# Patient Record
Sex: MALE | Race: AMERICAN INDIAN OR ALASKA NATIVE | HISPANIC OR LATINO | Employment: UNEMPLOYED | URBAN - METROPOLITAN AREA
[De-identification: names, ages, dates, MRNs, and addresses within clinical notes are randomized per-mention and may not be internally consistent; named-entity substitution may affect disease eponyms.]

---

## 2024-05-21 ENCOUNTER — HOSPITAL ENCOUNTER (EMERGENCY)
Facility: HOSPITAL | Age: 34
Discharge: HOME | End: 2024-05-21
Admitting: EMERGENCY MEDICINE
Payer: MEDICAID

## 2024-05-21 ENCOUNTER — APPOINTMENT (OUTPATIENT)
Dept: RADIOLOGY | Facility: HOSPITAL | Age: 34
End: 2024-05-21
Attending: STUDENT IN AN ORGANIZED HEALTH CARE EDUCATION/TRAINING PROGRAM
Payer: MEDICAID

## 2024-05-21 VITALS
SYSTOLIC BLOOD PRESSURE: 124 MMHG | TEMPERATURE: 97.9 F | RESPIRATION RATE: 18 BRPM | OXYGEN SATURATION: 99 % | HEART RATE: 71 BPM | DIASTOLIC BLOOD PRESSURE: 78 MMHG | WEIGHT: 179 LBS
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
## 2024-05-21 DIAGNOSIS — R07.81 RIB PAIN: ICD-10-CM

## 2024-05-21 PROCEDURE — 250N000013 HC RX MED GY IP 250 OP 250 PS 637: Performed by: EMERGENCY MEDICINE

## 2024-05-21 PROCEDURE — 71101 X-RAY EXAM UNILAT RIBS/CHEST: CPT | Mod: LT

## 2024-05-21 PROCEDURE — 99284 EMERGENCY DEPT VISIT MOD MDM: CPT

## 2024-05-21 RX ORDER — ACETAMINOPHEN 325 MG/1
975 TABLET ORAL ONCE
Status: COMPLETED | OUTPATIENT
Start: 2024-05-21 | End: 2024-05-21

## 2024-05-21 RX ORDER — LIDOCAINE 50 MG/G
1 PATCH TOPICAL EVERY 24 HOURS
Qty: 10 PATCH | Refills: 0 | Status: SHIPPED | OUTPATIENT
Start: 2024-05-21 | End: 2024-05-31

## 2024-05-21 RX ORDER — LIDOCAINE 4 G/G
1 PATCH TOPICAL ONCE
Status: DISCONTINUED | OUTPATIENT
Start: 2024-05-21 | End: 2024-05-21 | Stop reason: HOSPADM

## 2024-05-21 RX ORDER — IBUPROFEN 600 MG/1
600 TABLET, FILM COATED ORAL ONCE
Status: COMPLETED | OUTPATIENT
Start: 2024-05-21 | End: 2024-05-21

## 2024-05-21 RX ORDER — IBUPROFEN 600 MG/1
600 TABLET, FILM COATED ORAL EVERY 6 HOURS PRN
Qty: 30 TABLET | Refills: 0 | Status: SHIPPED | OUTPATIENT
Start: 2024-05-21

## 2024-05-21 RX ORDER — CYCLOBENZAPRINE HCL 10 MG
10 TABLET ORAL 3 TIMES DAILY PRN
Qty: 20 TABLET | Refills: 0 | Status: SHIPPED | OUTPATIENT
Start: 2024-05-21 | End: 2024-05-28

## 2024-05-21 RX ORDER — ACETAMINOPHEN 500 MG
1000 TABLET ORAL EVERY 6 HOURS PRN
Qty: 60 TABLET | Refills: 0 | Status: SHIPPED | OUTPATIENT
Start: 2024-05-21

## 2024-05-21 RX ADMIN — LIDOCAINE 1 PATCH: 4 PATCH TOPICAL at 14:29

## 2024-05-21 RX ADMIN — IBUPROFEN 600 MG: 600 TABLET, FILM COATED ORAL at 14:29

## 2024-05-21 RX ADMIN — ACETAMINOPHEN 975 MG: 325 TABLET ORAL at 14:29

## 2024-05-21 ASSESSMENT — COLUMBIA-SUICIDE SEVERITY RATING SCALE - C-SSRS
2. HAVE YOU ACTUALLY HAD ANY THOUGHTS OF KILLING YOURSELF IN THE PAST MONTH?: NO
6. HAVE YOU EVER DONE ANYTHING, STARTED TO DO ANYTHING, OR PREPARED TO DO ANYTHING TO END YOUR LIFE?: NO
1. IN THE PAST MONTH, HAVE YOU WISHED YOU WERE DEAD OR WISHED YOU COULD GO TO SLEEP AND NOT WAKE UP?: NO

## 2024-05-21 ASSESSMENT — ACTIVITIES OF DAILY LIVING (ADL)
ADLS_ACUITY_SCORE: 35
ADLS_ACUITY_SCORE: 33
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
## 2024-10-09 ENCOUNTER — HOSPITAL ENCOUNTER (OUTPATIENT)
Dept: GENERAL RADIOLOGY | Facility: HOSPITAL | Age: 34
Discharge: HOME | End: 2024-10-09
Attending: PHYSICIAN ASSISTANT | Admitting: PHYSICIAN ASSISTANT
Payer: MEDICAID

## 2024-10-09 ENCOUNTER — OFFICE VISIT (OUTPATIENT)
Dept: FAMILY MEDICINE | Facility: CLINIC | Age: 34
End: 2024-10-09
Payer: MEDICAID

## 2024-10-09 VITALS
DIASTOLIC BLOOD PRESSURE: 81 MMHG | RESPIRATION RATE: 18 BRPM | SYSTOLIC BLOOD PRESSURE: 136 MMHG | HEART RATE: 83 BPM | TEMPERATURE: 98.2 F | OXYGEN SATURATION: 97 %

## 2024-10-09 DIAGNOSIS — M79.671 RIGHT FOOT PAIN: ICD-10-CM

## 2024-10-09 DIAGNOSIS — S92.354A CLOSED NONDISPLACED FRACTURE OF FIFTH METATARSAL BONE OF RIGHT FOOT, INITIAL ENCOUNTER: Primary | ICD-10-CM

## 2024-10-09 PROCEDURE — 99204 OFFICE O/P NEW MOD 45 MIN: CPT | Performed by: PHYSICIAN ASSISTANT

## 2024-10-09 PROCEDURE — 73630 X-RAY EXAM OF FOOT: CPT | Mod: RT

## 2024-10-09 NOTE — PROGRESS NOTES
Patient presents with:  Fall: Yesterday Fall and injured right leg.      Clinical Decision Making:  X-ray showed a proximal fifth metatarsal fracture/Villarreal fracture.  Patient is placed in a cam walker boot for protected weightbearing status.  Follow-up with orthopedics for definitive evaluation and treatment.        ICD-10-CM    1. Closed nondisplaced fracture of fifth metatarsal bone of right foot, initial encounter  S92.354A Ankle/Foot Bracing Supplies Order Walking Boot; Right; Non-pneumatic; Short     Orthopedic  Referral      2. Right foot pain  M79.671 XR Foot Right G/E 3 Views          Patient Instructions     Ice topically to the area 20 minutes on each hour while awake.  Take precautions to avoid damage to the skin.  After 2 days, transition to ice topically 20 minutes 3 times per day.  After 2 days may add heat and alternate ice and heat.  Ibuprofen 600 mg (3 tablets) 3 times a day with food for analgesia and anti-inflammatory effect.  Do not use the ibuprofen if you have contraindications to using NSAIDs.  Injuries to the extremities may be elevated above level of the heart continuously for the first 2 days as much as possible.  Use of cam walker boot.  May be out of the boot for sleep and for hygiene.    Return to see orthopedics for definitive evaluation and treatment.        HPI:  Carolyne Rowley is a 34 year old male who presents today evaluation of right foot pain.  Patient was walking down the stairs with his child and suffered an inversion injury from a standing position to the right foot and hit the lateral aspect of the foot. He missed the last step of the stairs. Has had pain with weightbearing and has been unable to be fully weightbearing on the right lower extremity.  At this time patient denies ankle knee or hip pain of the ipsilateral side or contralateral left lower extremity injury head neck back or upper extremity injury to report.  At home has been activity modification elevation  and rest.    History obtained from chart review, the patient, and phone  with Mame.    Problem List:  There are no relevant problems documented for this patient.      No past medical history on file.    Social History     Tobacco Use    Smoking status: Every Day     Types: Vaping Device    Smokeless tobacco: Not on file   Substance Use Topics    Alcohol use: Not on file       Review of Systems  As above in HPI otherwise negative.    Vitals:    10/09/24 1416   BP: 136/81   BP Location: Left arm   Patient Position: Sitting   Cuff Size: Adult Regular   Pulse: 83   Resp: 18   Temp: 98.2  F (36.8  C)   TempSrc: Oral   SpO2: 97%       General: Patient is resting comfortably no acute distress is afebrile  HEENT: Head is normocephalic atraumatic   eyes are PERRL EOMI sclera anicteric   Musculoskeletal:  Inspection of the right lower extremity shows that the knee and ankle are nontender palpation full range of motion without effusion.  Ankles with the negative squeeze test  No pain to palpation over the medial or lateral malleoli.  Tarsal bones are nontender to palpation.  Lisfranc ligament is nontender at the tarsometatarsal junction.  No noted ecchymoses or swelling of the dorsum of the right foot.  Palpation over the third fourth and fifth metatarsal rays.  Patient has maximal tenderness to palpation over the proximal portion of the fifth metatarsal.    Physical Exam      Labs:  Results for orders placed or performed in visit on 10/09/24   XR Foot Right G/E 3 Views     Status: None    Narrative    EXAM: XR FOOT RIGHT G/E 3 VIEWS  LOCATION: [facility]  DATE: 10/9/2024    INDICATION: Foot pain.  COMPARISON: None.      Impression    IMPRESSION: Acute nondisplaced, minimally distracted oblique avulsion fracture in the base of the fifth metatarsal extending into the TMT joint. No additional fracture. Normal joint alignment and spacing. Mild soft tissue swelling in the lateral   forefoot.        Radiology:  I have personally ordered and preliminarily reviewed the following xray, I have noted a proximal fifth metatarsal fracture.     At the end of the encounter, I discussed results, diagnosis, medications. Discussed red flags for immediate return to clinic/ER, as well as indications for follow up if no improvement. Patient understood and agreed to plan. Patient was stable for discharge.

## 2024-10-09 NOTE — PATIENT INSTRUCTIONS
Ice topically to the area 20 minutes on each hour while awake.  Take precautions to avoid damage to the skin.  After 2 days, transition to ice topically 20 minutes 3 times per day.  After 2 days may add heat and alternate ice and heat.  Ibuprofen 600 mg (3 tablets) 3 times a day with food for analgesia and anti-inflammatory effect.  Do not use the ibuprofen if you have contraindications to using NSAIDs.  Injuries to the extremities may be elevated above level of the heart continuously for the first 2 days as much as possible.  Use of cam walker boot.  May be out of the boot for sleep and for hygiene.    Return to see orthopedics for definitive evaluation and treatment.

## 2024-10-14 ENCOUNTER — OFFICE VISIT (OUTPATIENT)
Dept: PODIATRY | Facility: CLINIC | Age: 34
End: 2024-10-14
Attending: PHYSICIAN ASSISTANT

## 2024-10-14 VITALS — SYSTOLIC BLOOD PRESSURE: 131 MMHG | HEART RATE: 81 BPM | DIASTOLIC BLOOD PRESSURE: 82 MMHG | WEIGHT: 179 LBS

## 2024-10-14 DIAGNOSIS — S92.354A CLOSED NONDISPLACED FRACTURE OF FIFTH METATARSAL BONE OF RIGHT FOOT, INITIAL ENCOUNTER: ICD-10-CM

## 2024-10-14 PROCEDURE — 99203 OFFICE O/P NEW LOW 30 MIN: CPT | Performed by: PODIATRIST

## 2024-10-14 ASSESSMENT — PAIN SCALES - GENERAL: PAINLEVEL: EXTREME PAIN (8)

## 2024-10-14 NOTE — PROGRESS NOTES
PATIENT HISTORY:  Carolyne Rowley is a 34 year old male who presents to clinic with a chief complaint of a painful right foot.  The patient relates the pain is located over the outside on the right foot.  The patient relates injuring the foot on 10/09/2024 while at work. Beets box fell of a pallet onto his foot The patient was seen by Family Practice with x-rays revealing a fractured fifth metatarsal on the right foot.  The patient was offloaded with a CAM boot.  The patient was instructed to follow up in my clinic for further evaluation and treatment options.    Medical, surgical and family history was reviewed in the chart.    Vitals: /82   Pulse 81   Wt 81.2 kg (179 lb)   BMI= There is no height or weight on file to calculate BMI.    LOWER EXTREMITY PHYSICAL EXAM    Dermatologic: Skin is intact to right lower extremities without significant lesions, rash or abrasion.        Vascular: DP & PT pulses are intact & regular on the right.   CFT and skin temperature is normal to the right lower extremities.     Neurologic: Lower extremity sensation is intact to light touch.  No evidence of weakness in the right lower extremities.        Musculoskeletal: Patient is ambulatory without assistive device or brace.  No gross ankle deformity noted.  No foot or ankle joint effusion is noted.  Noted pain on palpation over the dorsolateral aspect of the right foot.  Mild surrounding edema noted.  No ecchymosis noted.    Diagnostics: Radiographs were evaluated including non-weightbearing AP, lateral and medial oblique views of the right foot reveals a closed nondisplaced fracture of the fifth metatarsal base with no cortical erosions or periosteal elevation.  All joint margins appear stable.  There is no apparent tumor formation noted.  There is no evidence of foreign body.  The images were reviewed with the patient explaining the findings.      ASSESSMENT / PLAN:     ICD-10-CM    1. Closed nondisplaced fracture of fifth  metatarsal bone of right foot, initial encounter  S92.354A Orthopedic  Referral          I have explained to Luis Ahi about the conditions.  We discussed the underlying contributing factors of the condition as well as the treatment plan and expected length of recovery.  At this time, the patient will remain in the CAM boot.  He will not be able to return to work.  He will return in one month for reevaluation and repeat x-rays.    Carolyne verbalized agreement with and understanding of the rational for the diagnosis and treatment plan.  All questions were answered to best of my ability and the patient's satisfaction. The patient was advised to contact the clinic with any questions that may arise after the clinic visit.      Disclaimer: This note consists of symbols derived from keyboarding, dictation and/or voice recognition software. As a result, there may be errors in the script that have gone undetected. Please consider this when interpreting information found in this chart.       OBED Stanley D.P.M., F.BETSEY.F.A.S.

## 2024-10-14 NOTE — LETTER
10/14/2024      Carolyne Rowley  [address]  [place] [address]      Dear Colleague,    Thank you for referring your patient, Carolyne Rowley, to the [facility] [place]. Please see a copy of my visit note below.    PATIENT HISTORY:  Carolyne Rowley is a 34 year old male who presents to clinic with a chief complaint of a painful right foot.  The patient relates the pain is located over the outside on the right foot.  The patient relates injuring the foot on 10/09/2024 while at work. Beets box fell of a pallet onto his foot The patient was seen by Family Practice with x-rays revealing a fractured fifth metatarsal on the right foot.  The patient was offloaded with a CAM boot.  The patient was instructed to follow up in my clinic for further evaluation and treatment options.    Medical, surgical and family history was reviewed in the chart.    Vitals: /82   Pulse 81   Wt 81.2 kg (179 lb)   BMI= There is no height or weight on file to calculate BMI.    LOWER EXTREMITY PHYSICAL EXAM    Dermatologic: Skin is intact to right lower extremities without significant lesions, rash or abrasion.        Vascular: DP & PT pulses are intact & regular on the right.   CFT and skin temperature is normal to the right lower extremities.     Neurologic: Lower extremity sensation is intact to light touch.  No evidence of weakness in the right lower extremities.        Musculoskeletal: Patient is ambulatory without assistive device or brace.  No gross ankle deformity noted.  No foot or ankle joint effusion is noted.  Noted pain on palpation over the dorsolateral aspect of the right foot.  Mild surrounding edema noted.  No ecchymosis noted.    Diagnostics: Radiographs were evaluated including non-weightbearing AP, lateral and medial oblique views of the right foot reveals a closed nondisplaced fracture of the fifth metatarsal base with no cortical erosions or periosteal elevation.  All joint margins appear  stable.  There is no apparent tumor formation noted.  There is no evidence of foreign body.  The images were reviewed with the patient explaining the findings.      ASSESSMENT / PLAN:     ICD-10-CM    1. Closed nondisplaced fracture of fifth metatarsal bone of right foot, initial encounter  S92.354A Orthopedic  Referral          I have explained to Carolyne about the conditions.  We discussed the underlying contributing factors of the condition as well as the treatment plan and expected length of recovery.  At this time, the patient will remain in the CAM boot.  He will not be able to return to work.  He will return in one month for reevaluation and repeat x-rays.    Carolyne verbalized agreement with and understanding of the rational for the diagnosis and treatment plan.  All questions were answered to best of my ability and the patient's satisfaction. The patient was advised to contact the clinic with any questions that may arise after the clinic visit.      Disclaimer: This note consists of symbols derived from keyboarding, dictation and/or voice recognition software. As a result, there may be errors in the script that have gone undetected. Please consider this when interpreting information found in this chart.       ALIE Prescott.PJOSIAH., F.A.C.F.A.S.      Again, thank you for allowing me to participate in the care of your patient.        Sincerely,        Arun Stanley DPM

## 2024-10-14 NOTE — LETTER
October 14, 2024      Carolyne Rowley  [address]   [place] [address]        To Whom It May Concern:    Carolyne Rowley  was seen on 10/14/24.  Please excuse him from work until 11/11/24 due to injury.        Sincerely,        Arun Stanley, KARLIEM

## 2024-10-14 NOTE — PATIENT INSTRUCTIONS
TOE & METATARSAL FRACTURES  The structure of the foot is complex, consisting of bones, muscles, tendons, and other soft tissues. Of the 26 bones in the foot, 19 are toe bones (phalanges) and metatarsal bones (the long bones in the midfoot). Fractures of the toe and metatarsal bones are common and require evaluation by a specialist. A foot and ankle surgeon should be seen for proper diagnosis and treatment, even if initial treatment has been received in an emergency room.  A fracture is a break in the bone. Fractures can be divided into two categories: traumatic fractures and stress fractures.  TRAUMATIC FRACTURES (also called acute fractures) are caused by a direct blow or impact, such as seriously stubbing your toe. Traumatic fractures can be displaced or non-displaced. If the fracture is displaced, the bone is broken in such a way that it has changed in position (dislocated).  Signs and symptoms of a traumatic fracture include:  You may hear a sound at the time of the break.    Pinpoint pain  (pain at the place of impact) at the time the fracture occurs and perhaps for a few hours later, but often the pain goes away after several hours.   Crooked or abnormal appearance of the toe.   Bruising and swelling the next day.   It is not true that  if you can walk on it, it s not broken.  Evaluation by a foot and ankle surgeon is always recommended.   STRESS FRACTURES are tiny, hairline breaks that are usually caused by repetitive stress. Stress fractures often afflict athletes who, for example, too rapidly increase their running mileage. They can also be caused by an abnormal foot structure, deformities, or osteoporosis. Improper footwear may also lead to stress fractures. Stress fractures should not be ignored. They require proper medical attention to heal correctly.  Symptoms of stress fractures include:  Pain with or after normal activity   Pain that goes away when resting and then returns when standing or during  activity    Pinpoint pain  (pain at the site of the fracture) when touched   Swelling, but no bruising   IMPROPER TREATMENT  Some people say that  the doctor can t do anything for a broken bone in the foot.  This is usually not true. In fact, if a fractured toe or metatarsal bone is not treated correctly, serious complications may develop. For example:  A deformity in the bony architecture which may limit the ability to move the foot or cause difficulty in fitting shoes   Arthritis, which may be caused by a fracture in a joint (the juncture where two bones meet), or may be a result of angular deformities that develop when a displaced fracture is severe or hasn t been properly corrected   Chronic pain and deformity   Non-union, or failure to heal, can lead to subsequent surgery or chronic pain.   PROPER TREATMENT FOR TOES  Fractures of the toe bones are almost always traumatic fractures. Treatment for traumatic fractures depends on the break itself and may include these options:  Rest. Sometimes rest is all that is needed to treat a traumatic fracture of the toe.   Splinting. The toe may be fitted with a splint to keep it in a fixed position.   Rigid or stiff-soled shoe. Wearing a stiff-soled shoe protects the toe and helps keep it properly positioned.    Sathish taping  the fractured toe to another toe is sometimes appropriate, but in other cases it may be harmful.   Surgery. If the break is badly displaced or if the joint is affected, surgery may be necessary. Surgery often involves the use of fixation devices, such as pins.   PROPER TREATMENT OF METATARSALS  Breaks in the metatarsal bones may be either stress or traumatic fractures. Certain kinds of fractures of the metatarsal bones present unique challenges.  For example, sometimes a fracture of the first metatarsal bone (behind the big toe) can lead to arthritis. Since the big toe is used so frequently and bears more weight than other toes, arthritis in that area  can make it painful to walk, bend, or even stand.  Another type of break, called a Villarreal fracture, occurs at the base of the fifth metatarsal bone (behind the little toe). It is often misdiagnosed as an ankle sprain, and misdiagnosis can have serious consequences since sprains and fractures require different treatments. Your foot and ankle surgeon is an expert in correctly identifying these conditions as well as other problems of the foot.  Treatment of metatarsal fractures depends on the type and extent of the fracture, and may include:  Rest. Sometimes rest is the only treatment needed to promote healing of a stress or traumatic fracture of a metatarsal bone.   Avoid the offending activity. Because stress fractures result from repetitive stress, it is important to avoid the activity that led to the fracture. Crutches or a wheelchair are sometimes required to offload weight from the foot to give it time to heal.   Immobilization, casting, or rigid shoe. A stiff-soled shoe or other form of immobilization may be used to protect the fractured bone while it is healing.   Surgery. Some traumatic fractures of the metatarsal bones require surgery, especially if the break is badly displaced.   Follow-up care. Your foot and ankle surgeon will provide instructions for care following surgical or non-surgical treatment. Physical therapy, exercises and rehabilitation may be included in a schedule for return to normal activities.

## 2024-10-14 NOTE — NURSING NOTE
Chief Complaint   Patient presents with    Fracture     Right foot- WORk comp injury       Initial /82   Pulse 81   Wt 81.2 kg (179 lb)  There is no height or weight on file to calculate BMI.  Medications and allergies reviewed.      Nancy STRANGE MA

## 2024-12-06 ENCOUNTER — ANCILLARY PROCEDURE (OUTPATIENT)
Dept: GENERAL RADIOLOGY | Facility: CLINIC | Age: 34
End: 2024-12-06
Attending: PODIATRIST

## 2024-12-06 ENCOUNTER — OFFICE VISIT (OUTPATIENT)
Dept: PODIATRY | Facility: CLINIC | Age: 34
End: 2024-12-06
Attending: PODIATRIST
Payer: OTHER MISCELLANEOUS

## 2024-12-06 VITALS — WEIGHT: 179 LBS

## 2024-12-06 DIAGNOSIS — S92.354A CLOSED NONDISPLACED FRACTURE OF FIFTH METATARSAL BONE OF RIGHT FOOT, INITIAL ENCOUNTER: Primary | ICD-10-CM

## 2024-12-06 PROCEDURE — 99213 OFFICE O/P EST LOW 20 MIN: CPT | Performed by: PODIATRIST

## 2024-12-06 PROCEDURE — 73630 X-RAY EXAM OF FOOT: CPT | Mod: TC | Performed by: RADIOLOGY

## 2024-12-06 RX ORDER — ACETAMINOPHEN 325 MG/1
650 TABLET ORAL EVERY 4 HOURS PRN
Qty: 180 TABLET | Refills: 0 | Status: SHIPPED | OUTPATIENT
Start: 2024-12-06

## 2024-12-06 RX ORDER — IBUPROFEN 800 MG/1
800 TABLET, FILM COATED ORAL EVERY 8 HOURS PRN
Qty: 42 TABLET | Refills: 1 | Status: SHIPPED | OUTPATIENT
Start: 2024-12-06

## 2024-12-06 RX ORDER — MELOXICAM 7.5 MG/1
7.5 TABLET ORAL DAILY
Qty: 30 TABLET | Refills: 1 | Status: SHIPPED | OUTPATIENT
Start: 2024-12-06 | End: 2024-12-06

## 2024-12-06 RX ORDER — IBUPROFEN 800 MG/1
800 TABLET, FILM COATED ORAL EVERY 8 HOURS PRN
Qty: 42 TABLET | Refills: 1 | Status: SHIPPED | OUTPATIENT
Start: 2024-12-06 | End: 2024-12-06

## 2024-12-06 RX ORDER — ACETAMINOPHEN 325 MG/1
650 TABLET ORAL EVERY 4 HOURS PRN
Qty: 180 TABLET | Refills: 0 | Status: SHIPPED | OUTPATIENT
Start: 2024-12-06 | End: 2024-12-06

## 2024-12-06 NOTE — Clinical Note
12/6/2024      Carolyne Rowley  [address]  [place] [address]      Dear Colleague,    Thank you for referring your patient, Carolyne Rowley, to the [facility]. Please see a copy of my visit note below.    Carolyne returns to the office for reevaluation of the {RIGHT /LEFT:084890} foot.  The patient relates following the instructions given at the last visit with noted overall {LESS/MORE:777339} pain and {LESS/MORE:637817} improvement in function of the {RIGHT /LEFT:722993} foot.   The patient relates no other problems.    Vitals: There were no vitals taken for this visit.  BMI= There is no height or weight on file to calculate BMI.    Lower Extremity Physical Exam:      Neurovascular status remains unchanged.  Muscular exam is within normal limits to major muscle groups.  Integument is intact.      Noted ***    Diagnostics:  Radiograph evaluation including three views of the {RIGHT /LEFT:891627} foot reveals interval healing with increased trabeculation of the ***.  I personally evaluated the images as well as reviewed the images with the patient pointing out the findings.      Assessment:     ICD-10-CM    1. Closed nondisplaced fracture of fifth metatarsal bone of right foot, initial encounter  S92.354A           Plan:    I have explained to Carolyne about the progress of the conditions.  At this time, ***    Carolyne verbalized agreement with and understanding of the rational for the diagnosis and treatment plan.  All questions were answered to best of my ability and the patient's satisfaction. The patient was advised to contact the clinic with any questions that may arise after the clinic visit.      Disclaimer: This note consists of symbols derived from keyboarding, dictation and/or voice recognition software. As a result, there may be errors in the script that have gone undetected. Please consider this when interpreting information found in this chart.       OBED Stanley,  JULIUS., F.A.C.F.A.S.        Again, thank you for allowing me to participate in the care of your patient.        Sincerely,        Arun Stanley DPM

## 2024-12-06 NOTE — PROGRESS NOTES
Carolyne returns to the office for reevaluation of the right foot.  The patient relates following the instructions given at the last visit with noted overall less pain and more improvement in function of the right foot.   The patient relates no other problems.    Vitals: Wt 81.2 kg (179 lb)   BMI= There is no height or weight on file to calculate BMI.    Lower Extremity Physical Exam:      Neurovascular status remains unchanged.  Muscular exam is within normal limits to major muscle groups.  Integument is intact.      Noted decreased pain on palpation of the fifth metatarsal bone on the right foot.  Decreased edema noted.    Diagnostics:  Radiograph evaluation including three views of the right foot reveals interval healing with increased trabeculation of the fifth metatarsal fracture.  I personally evaluated the images as well as reviewed the images with the patient pointing out the findings.      Assessment:     ICD-10-CM    1. Closed nondisplaced fracture of fifth metatarsal bone of right foot, initial encounter  S92.354A XR Foot Right G/E 3 Views     ibuprofen (ADVIL/MOTRIN) 800 MG tablet     acetaminophen (TYLENOL) 325 MG tablet     DISCONTINUED: acetaminophen (TYLENOL) 325 MG tablet     DISCONTINUED: meloxicam (MOBIC) 7.5 MG tablet     DISCONTINUED: ibuprofen (ADVIL/MOTRIN) 800 MG tablet          Plan:    I have explained to Carolyne about the progress of the conditions.  At this time, the patient was educated on the importance of offloading supportive shoes and other devices.   The patient was instructed to perform warm soaks with Epson salt after which to also apply over-the-counter Voltaren gel to deeply massage the injured tissue.  The patient was instructed to do this on a daily basis until symptoms resolve.    The patient may return in four weeks for reevaluation to determine if any further treatment will be needed.      Carolyne verbalized agreement with and understanding of the rational for the diagnosis and  treatment plan.  All questions were answered to best of my ability and the patient's satisfaction. The patient was advised to contact the clinic with any questions that may arise after the clinic visit.      Disclaimer: This note consists of symbols derived from keyboarding, dictation and/or voice recognition software. As a result, there may be errors in the script that have gone undetected. Please consider this when interpreting information found in this chart.       OBED Stanley D.P.M., F.JOHANNA.C.F.A.S.

## 2024-12-06 NOTE — LETTER
December 6, 2024      Carolyne Rowley  [address]   [place] [address]        To Whom It May Concern:    Carolyne Rowley was seen in our clinic. He may return to work with the following: seated work only on or about 12/6/24 thru 1/6/25.      Sincerely,      OBED Stanley DPM

## 2024-12-15 ENCOUNTER — HEALTH MAINTENANCE LETTER (OUTPATIENT)
Age: 34
End: 2024-12-15

## 2024-12-19 ENCOUNTER — OFFICE VISIT (OUTPATIENT)
Dept: URGENT CARE | Facility: URGENT CARE | Age: 34
End: 2024-12-19
Payer: COMMERCIAL

## 2024-12-19 VITALS
SYSTOLIC BLOOD PRESSURE: 142 MMHG | TEMPERATURE: 98.8 F | HEART RATE: 94 BPM | DIASTOLIC BLOOD PRESSURE: 92 MMHG | OXYGEN SATURATION: 95 % | RESPIRATION RATE: 20 BRPM

## 2024-12-19 DIAGNOSIS — S92.354G CLOSED NONDISPLACED FRACTURE OF FIFTH METATARSAL BONE OF RIGHT FOOT WITH DELAYED HEALING, SUBSEQUENT ENCOUNTER: ICD-10-CM

## 2024-12-19 DIAGNOSIS — M79.671 RIGHT FOOT PAIN: Primary | ICD-10-CM

## 2024-12-19 RX ORDER — NAPROXEN 500 MG/1
500 TABLET ORAL 2 TIMES DAILY WITH MEALS
Qty: 60 TABLET | Refills: 0 | Status: SHIPPED | OUTPATIENT
Start: 2024-12-19

## 2024-12-19 NOTE — PROGRESS NOTES
Assessment & Plan     Right foot pain    - naproxen (NAPROSYN) 500 MG tablet  Dispense: 60 tablet; Refill: 0  - Crutches Order for DME - ONLY FOR DME    Closed nondisplaced fracture of fifth metatarsal bone of right foot with delayed healing, subsequent encounter  Patient was seen for right foot pain and swelling with known fifth metatarsal fracture.  His swelling and pain are localized to the lateral foot at the area of his known fracture.  The symptoms are not diffuse and there is no signs of lower leg swelling calf swelling or calf pain.  Low concern for DVT.  Patient was given crutches to weight-bear as tolerated with his cam boot on.  He will continue to use his cam boot given naproxen for pain.  He will follow-up with his podiatrist next week for recheck.  Sooner for any persistent or worsening symptoms  - naproxen (NAPROSYN) 500 MG tablet  Dispense: 60 tablet; Refill: 0  - Crutches Order for DME - ONLY FOR DME             No follow-ups on file.    Justine Reyna PA-C  [facility] is a 34 year old male who presents to clinic today for the following health issues:  Chief Complaint   Patient presents with    Trauma     Rt heel and Rt 5th metatarsal pain x last night. Pain with walking and weight bearing, pain causes inability to sleep at night. Swelling.         12/19/2024     5:31 PM   Additional Questions   Roomed by Antonio MCLEAN MA   Accompanied by Self     HPI  Patient is a 34-year-old male with known history of right fifth metatarsal fracture who presents to urgent care with concerns regarding foot pain and swelling.  He was seen by his podiatrist 1 week ago.  Symptoms of pain and swelling are located to his lateral foot.  He also has pain on his heel.  He denies any tingling or numbness.  He denies any calf pain.  He denies recently being more active and states he always wears his boot.  At his last visit 1 week ago with podiatry he was told the  fracture is not healing as expected and plans a recheck in 1 month.  He is taking Tylenol for pain.    Review of Systems  Constitutional, HEENT, cardiovascular, pulmonary, gi and gu systems are negative, except as otherwise noted.      Objective    BP (!) 142/92   Pulse 94   Temp 98.8  F (37.1  C) (Tympanic)   Resp 20   SpO2 95%   Physical Exam   Examination of the right lower extremity reveals mild lateral foot swelling.  There is no erythema.  Sensation peripheral pulses are intact.  Calf is soft and nontender to palpation there is no pretibial swelling or edema noted.  Foot is warm and dry.  Cap refill is brisk.  He has tenderness to palpation of the base of the fifth metatarsal.  There is mild tenderness to palpation on the plantar surface of the heel.  No Achilles tenderness is noted.  He has no pain with active range of motion at the ankle. No ankle swelling.

## 2025-01-08 ENCOUNTER — OFFICE VISIT (OUTPATIENT)
Dept: PODIATRY | Facility: CLINIC | Age: 35
End: 2025-01-08
Payer: OTHER MISCELLANEOUS

## 2025-01-08 ENCOUNTER — ANCILLARY PROCEDURE (OUTPATIENT)
Dept: GENERAL RADIOLOGY | Facility: CLINIC | Age: 35
End: 2025-01-08
Attending: PODIATRIST
Payer: COMMERCIAL

## 2025-01-08 VITALS — WEIGHT: 179 LBS

## 2025-01-08 DIAGNOSIS — S92.354A CLOSED NONDISPLACED FRACTURE OF FIFTH METATARSAL BONE OF RIGHT FOOT, INITIAL ENCOUNTER: Primary | ICD-10-CM

## 2025-01-08 PROCEDURE — 73630 X-RAY EXAM OF FOOT: CPT | Mod: TC | Performed by: RADIOLOGY

## 2025-01-08 PROCEDURE — 99213 OFFICE O/P EST LOW 20 MIN: CPT | Performed by: PODIATRIST

## 2025-01-08 ASSESSMENT — PAIN SCALES - GENERAL: PAINLEVEL_OUTOF10: MILD PAIN (3)

## 2025-01-08 NOTE — LETTER
1/8/2025      Carolyne Rowley  [address]  [place] [address]      Dear Colleague,    Thank you for referring your patient, Carolyne Rowley, to the [facility] [place]. Please see a copy of my visit note below.    Carolyne returns to the office for reevaluation of the right foot.  The patient relates following the instructions given at the last visit with noted overall less pain and more improvement in function of the right foot.   The patient relates no other problems.    Vitals: Wt 81.2 kg (179 lb)   BMI= There is no height or weight on file to calculate BMI.    Lower Extremity Physical Exam:      Neurovascular status remains unchanged.  Muscular exam is within normal limits to major muscle groups.  Integument is intact.      Noted decreased pain on palpation over the base of the fifth metatarsal on the right foot.  No erythema or edema noted.    Diagnostics:  Radiograph evaluation including three views of the right foot reveals interval healing with increased trabeculation of the fifth metatarsal base fracture.  I personally evaluated the images as well as reviewed the images with the patient pointing out the findings.      Assessment:     ICD-10-CM    1. Closed nondisplaced fracture of fifth metatarsal bone of right foot, initial encounter  S92.354A XR Foot Right G/E 3 Views          Plan:    I have explained to Carolyne about the progress of the conditions.  At this time, the patient will continue wearing the cam boot to further offload the foot and allow further fracture healing to take place.  The patient will return in 4 weeks for reevaluation and repeat x-rays.    Carolyne verbalized agreement with and understanding of the rational for the diagnosis and treatment plan.  All questions were answered to best of my ability and the patient's satisfaction. The patient was advised to contact the clinic with any questions that may arise after the clinic visit.      Disclaimer: This note  consists of symbols derived from keyboarding, dictation and/or voice recognition software. As a result, there may be errors in the script that have gone undetected. Please consider this when interpreting information found in this chart.       OBED Stanley D.P.M., F.A.C.F.A.S.      Again, thank you for allowing me to participate in the care of your patient.        Sincerely,        Arun Stanley DPM    Electronically signed

## 2025-01-08 NOTE — LETTER
January 8, 2025      Carolyne Rowley  [address]   [place] [address]        To Whom It May Concern:    Carolyne Rowley was seen in our clinic. He may return to work with the following: seated work only on or about 1/8/25 thru 2/5/25.       Sincerely,      OBED Stanley DPM      Electronically signed

## 2025-01-08 NOTE — PROGRESS NOTES
Carolyne returns to the office for reevaluation of the right foot.  The patient relates following the instructions given at the last visit with noted overall less pain and more improvement in function of the right foot.   The patient relates no other problems.    Vitals: Wt 81.2 kg (179 lb)   BMI= There is no height or weight on file to calculate BMI.    Lower Extremity Physical Exam:      Neurovascular status remains unchanged.  Muscular exam is within normal limits to major muscle groups.  Integument is intact.      Noted decreased pain on palpation over the base of the fifth metatarsal on the right foot.  No erythema or edema noted.    Diagnostics:  Radiograph evaluation including three views of the right foot reveals interval healing with increased trabeculation of the fifth metatarsal base fracture.  I personally evaluated the images as well as reviewed the images with the patient pointing out the findings.      Assessment:     ICD-10-CM    1. Closed nondisplaced fracture of fifth metatarsal bone of right foot, initial encounter  S92.354A XR Foot Right G/E 3 Views          Plan:    I have explained to Carolyne about the progress of the conditions.  At this time, the patient will continue wearing the cam boot to further offload the foot and allow further fracture healing to take place.  The patient will return in 4 weeks for reevaluation and repeat x-rays.    Carolyne verbalized agreement with and understanding of the rational for the diagnosis and treatment plan.  All questions were answered to best of my ability and the patient's satisfaction. The patient was advised to contact the clinic with any questions that may arise after the clinic visit.      Disclaimer: This note consists of symbols derived from keyboarding, dictation and/or voice recognition software. As a result, there may be errors in the script that have gone undetected. Please consider this when interpreting information found in this chartKatarina CLAY  Donny Stanley D.P.M., JAY JAY.F.A.S.

## 2025-01-16 DIAGNOSIS — M79.671 RIGHT FOOT PAIN: ICD-10-CM

## 2025-01-16 DIAGNOSIS — S92.354G CLOSED NONDISPLACED FRACTURE OF FIFTH METATARSAL BONE OF RIGHT FOOT WITH DELAYED HEALING, SUBSEQUENT ENCOUNTER: ICD-10-CM

## 2025-01-16 RX ORDER — NAPROXEN 500 MG/1
500 TABLET ORAL 2 TIMES DAILY WITH MEALS
Qty: 60 TABLET | Refills: 0 | OUTPATIENT
Start: 2025-01-16

## 2025-02-14 ENCOUNTER — OFFICE VISIT (OUTPATIENT)
Dept: PODIATRY | Facility: CLINIC | Age: 35
End: 2025-02-14
Payer: OTHER MISCELLANEOUS

## 2025-02-14 ENCOUNTER — ANCILLARY PROCEDURE (OUTPATIENT)
Dept: GENERAL RADIOLOGY | Facility: CLINIC | Age: 35
End: 2025-02-14
Attending: PODIATRIST
Payer: COMMERCIAL

## 2025-02-14 DIAGNOSIS — M76.821 POSTERIOR TIBIAL TENDON DYSFUNCTION (PTTD) OF BOTH LOWER EXTREMITIES: ICD-10-CM

## 2025-02-14 DIAGNOSIS — M72.2 PLANTAR FASCIITIS, RIGHT: ICD-10-CM

## 2025-02-14 DIAGNOSIS — S92.354A CLOSED NONDISPLACED FRACTURE OF FIFTH METATARSAL BONE OF RIGHT FOOT, INITIAL ENCOUNTER: Primary | ICD-10-CM

## 2025-02-14 DIAGNOSIS — M76.822 POSTERIOR TIBIAL TENDON DYSFUNCTION (PTTD) OF BOTH LOWER EXTREMITIES: ICD-10-CM

## 2025-02-14 PROCEDURE — 99213 OFFICE O/P EST LOW 20 MIN: CPT | Performed by: PODIATRIST

## 2025-02-14 PROCEDURE — 73630 X-RAY EXAM OF FOOT: CPT | Mod: TC | Performed by: RADIOLOGY

## 2025-02-14 ASSESSMENT — PAIN SCALES - GENERAL: PAINLEVEL_OUTOF10: SEVERE PAIN (7)

## 2025-02-14 NOTE — NURSING NOTE
Chief Complaint   Patient presents with    Fracture Followup     Right foot- sharp pain around the heels and the fracture site       Initial There were no vitals taken for this visit. There is no height or weight on file to calculate BMI.  Medications and allergies reviewed.      Nancy STRANGE MA

## 2025-02-14 NOTE — LETTER
February 14, 2025      Carolyne Rowley  [address]   [place] [address]        To Whom It May Concern:    Carolyne Rowley was seen in our clinic. He may return to work with the following: seated work only on or about 2/14/25 thru 3/14/25.      Sincerely,      OBED Stanley DPM      Electronically signed

## 2025-02-14 NOTE — PATIENT INSTRUCTIONS

## 2025-02-14 NOTE — Clinical Note
2/14/2025      Carolyne Rowley  [address]  [place] [address]      Dear Colleague,    Thank you for referring your patient, Carolyne Rowley, to the [facility]. Please see a copy of my visit note below.    Carolyne returns to the office for reevaluation of the {RIGHT /LEFT:792244} foot.  The patient relates following the instructions given at the last visit with noted overall {LESS/MORE:527054} pain and {LESS/MORE:621556} improvement in function of the {RIGHT /LEFT:604529} foot.   The patient relates no other problems.    Vitals: There were no vitals taken for this visit.  BMI= There is no height or weight on file to calculate BMI.    Lower Extremity Physical Exam:      Neurovascular status remains unchanged.  Muscular exam is within normal limits to major muscle groups.  Integument is intact.      Noted ***    Diagnostics:  Radiograph evaluation including three views of the {RIGHT /LEFT:305783} foot reveals interval healing with increased trabeculation of the ***.  I personally evaluated the images as well as reviewed the images with the patient pointing out the findings.      Assessment:     ICD-10-CM    1. Closed nondisplaced fracture of fifth metatarsal bone of right foot, initial encounter  S92.354A           Plan:    I have explained to Carolyne about the progress of the conditions.  At this time, ***    Carolyne verbalized agreement with and understanding of the rational for the diagnosis and treatment plan.  All questions were answered to best of my ability and the patient's satisfaction. The patient was advised to contact the clinic with any questions that may arise after the clinic visit.      Disclaimer: This note consists of symbols derived from keyboarding, dictation and/or voice recognition software. As a result, there may be errors in the script that have gone undetected. Please consider this when interpreting information found in this chart.       OBED Stanley,  JULIUS., F.A.C.F.A.S.        Again, thank you for allowing me to participate in the care of your patient.        Sincerely,        Arun Stanley DPM    Electronically signed

## 2025-02-14 NOTE — PROGRESS NOTES
Carolyne returns to the office for reevaluation of the right foot.  The patient relates following the instructions given at the last visit with noted overall continued pain and minimal improvement in function of the right foot.   The patient relates no other problems.         Lower Extremity Physical Exam:      Neurovascular status remains unchanged.  Muscular exam is within normal limits to major muscle groups.  Integument is intact.      Noted pain on palpation under the right heel near the insertion point of the plantar fascia on the right.  Noted  collapse of the medial longitudinal arch with forefoot abduction bilaterally upon weight bearing exam.  Noted positive Kenroy's test bilaterally.  Noted tight gastroc complex bilaterally.       Diagnostics:  Radiograph evaluation including three views of the right foot reveals interval healing with increased trabeculation of the fifth metatarsal fracture.  I personally evaluated the images as well as reviewed the images with the patient pointing out the findings.      Assessment:     ICD-10-CM    1. Closed nondisplaced fracture of fifth metatarsal bone of right foot, initial encounter  S92.354A           Plan:    I have explained to Carolyne about the progress of the conditions.  At this time, the patient was educated on the importance of offloading supportive shoes and other devices.  I demonstrated to the patient calf stretches to perform every hour daily until symptoms resolve.  After symptoms resolve, the patient was advised to perform the stretches 3 times daily to prevent future recurrence.  The patient was instructed to perform warm soaks with Epson salt after which to also apply over-the-counter Voltaren gel to deeply massage the injured tissue.  The patient was instructed to do this on a daily basis until symptoms resolve.   The patient was prescribed custom orthotics that will aid in offloading the tension forces to the soft tissues and prevent further inflammation.   The patient was referred to physical therapy for fascial tooling of the plantar fascia on the right.  The patient may return in four weeks for reevaluation to determine if any further treatment will be needed.      Carolyne verbalized agreement with and understanding of the rational for the diagnosis and treatment plan.  All questions were answered to best of my ability and the patient's satisfaction. The patient was advised to contact the clinic with any questions that may arise after the clinic visit.      Disclaimer: This note consists of symbols derived from keyboarding, dictation and/or voice recognition software. As a result, there may be errors in the script that have gone undetected. Please consider this when interpreting information found in this chart.       OBED Stanley D.P.M., F.A.C.F.A.S.

## 2025-03-11 ENCOUNTER — OFFICE VISIT (OUTPATIENT)
Dept: URGENT CARE | Facility: URGENT CARE | Age: 35
End: 2025-03-11

## 2025-03-11 VITALS
SYSTOLIC BLOOD PRESSURE: 143 MMHG | OXYGEN SATURATION: 96 % | WEIGHT: 190.6 LBS | HEART RATE: 79 BPM | TEMPERATURE: 97.7 F | RESPIRATION RATE: 18 BRPM | DIASTOLIC BLOOD PRESSURE: 93 MMHG

## 2025-03-11 DIAGNOSIS — J06.9 VIRAL URI: Primary | ICD-10-CM

## 2025-03-11 PROCEDURE — 99213 OFFICE O/P EST LOW 20 MIN: CPT | Performed by: FAMILY MEDICINE

## 2025-03-11 NOTE — PROGRESS NOTES
Assessment:       Viral URI       Plan:     Symptoms consistent with a viral upper respiratory infection.  Discussed the typical course of symptoms.  Noantibiotics indicated at this time.  Recommend symptomatic treatment such as decongestants and acetominephen or ibuprofen as needed.  Recommend follow up if getting worse or not improving.      Subjective:       34 year old male presents for evaluation of a 1 day history of runny nose.  Denies sore throat, ear pain, cough, fevers, chills, headache, body aches.  Denies shortness of breath or wheezing.    There is no problem list on file for this patient.      No past medical history on file.    No past surgical history on file.    Current Outpatient Medications   Medication Sig Dispense Refill    naproxen (NAPROSYN) 500 MG tablet Take 1 tablet (500 mg) by mouth 2 times daily (with meals). (Patient not taking: Reported on 3/11/2025) 60 tablet 0     No current facility-administered medications for this visit.       No Known Allergies    No family history on file.    Social History     Socioeconomic History    Marital status: Single     Spouse name: None    Number of children: None    Years of education: None    Highest education level: None   Tobacco Use    Smoking status: Some Days     Types: Vaping Device   Vaping Use    Vaping status: Every Day         Review of Systems  Pertinent items are noted in HPI.      Objective:                 General Appearance:    BP (!) 143/93   Pulse 79   Temp 97.7  F (36.5  C) (Oral)   Resp 18   Wt 86.5 kg (190 lb 9.6 oz)   SpO2 96%         Alert, pleasant, cooperative, no distress, appears stated age   Head:    Normocephalic, without obvious abnormality, atraumatic   Eyes:    Conjunctiva/corneas clear   Ears:    Normal TM's without erythema or bulging. Normal external ear canals, both ears   Nose:   Nares normal, septum midline, mucosa normal, no drainage    or sinus tenderness   Throat:   Lips, mucosa, and tongue normal; teeth  and gums normal.  No tonsilar hypertrophy or exudate.   Neck:   Supple, symmetrical, trachea midline, no adenopathy    Lungs:     Clear to auscultation bilaterally without wheezes, rales, or rhonchi, respirations unlabored    Heart:    Regular rate and rhythm, S1 and S2 normal, no murmur, rub or gallop       Extremities:   Extremities normal, atraumatic, no cyanosis or edema   Skin:   Skin color, texture, turgor normal, no rashes or lesions         This note has been dictated using voice recognition software. Any grammatical or context distortions are unintentional and inherent to the software

## 2025-03-17 ENCOUNTER — OFFICE VISIT (OUTPATIENT)
Dept: PODIATRY | Facility: CLINIC | Age: 35
End: 2025-03-17
Attending: PODIATRIST
Payer: OTHER MISCELLANEOUS

## 2025-03-17 ENCOUNTER — ANCILLARY PROCEDURE (OUTPATIENT)
Dept: GENERAL RADIOLOGY | Facility: CLINIC | Age: 35
End: 2025-03-17
Attending: PODIATRIST

## 2025-03-17 DIAGNOSIS — S92.354A CLOSED NONDISPLACED FRACTURE OF FIFTH METATARSAL BONE OF RIGHT FOOT, INITIAL ENCOUNTER: Primary | ICD-10-CM

## 2025-03-17 PROCEDURE — 73630 X-RAY EXAM OF FOOT: CPT | Mod: TC | Performed by: RADIOLOGY

## 2025-03-17 NOTE — LETTER
3/17/2025      Carolyne Rowley  [address]  [address] [place] [address]      Dear Colleague,    Thank you for referring your patient, Carolyne Rowley, to the [facility] [place]. Please see a copy of my visit note below.    Carolyne returns to the office with    assistance for reevaluation of the right foot.  The patient relates following the instructions given at the last visit with noted overall continued  pain and minimal  improvement in function of the right foot.   The patient relates no other problems.      Lower Extremity Physical Exam:      Neurovascular status remains unchanged.  Muscular exam is within normal limits to major muscle groups.  Integument is intact.      Noted pain on palpation over the base of the fifth metatarsal on the right foot.  Noted moderate edema.  No pain on palpation over the gastroc muscle belly on the right    Diagnostics:  Radiograph evaluation including three views of the right foot reveals interval healing with increased trabeculation of the fifth metatarsal base fracture.  I personally evaluated the images as well as reviewed the images with the patient pointing out the findings.      Assessment:     ICD-10-CM    1. Closed nondisplaced fracture of fifth metatarsal bone of right foot, initial encounter  S92.354A           Plan:    I have explained to Carolyne about the progress of the conditions.  At this time, the patient was placed into a short leg cast to further offload the fractured fifth metatarsal.  The fiberglass cast was applied with the right ankle at ninety degrees.  The cast was not tight.  He was instructed to remain non weight bearing with crutches.  The patient was informed that he cannot drive with the cast being that it is on his driving foot.  The patient will return in one month for reevaluation and repeat x-rays.    Carolyne verbalized agreement with and understanding of the rational for the diagnosis and treatment plan.  All  questions were answered to best of my ability and the patient's satisfaction. The patient was advised to contact the clinic with any questions that may arise after the clinic visit.      Disclaimer: This note consists of symbols derived from keyboarding, dictation and/or voice recognition software. As a result, there may be errors in the script that have gone undetected. Please consider this when interpreting information found in this chart.       OBED Stanley D.P.M., F.A.C.F.A.S.    Cast/splint application    Date/Time: 3/17/2025 3:15 PM    Performed by: Ava Murphy ATC  Authorized by: Arun Stanley DPM    Consent:     Consent obtained:  Verbal    Consent given by:  Patient    Risks discussed:  Discoloration, numbness, pain and swelling    Alternatives discussed:  Alternative treatment, observation and referral  Pre-procedure details:     Sensation:  Normal  Procedure details:     Laterality:  Right    Location:  Foot    Foot:  R foot    Cast type:  Short leg    Supplies:  Fiberglass  Post-procedure details:     Pain:  Unchanged    Sensation:  Normal    Patient tolerance of procedure:  Tolerated well, no immediate complications    Patient provided with cast or splint care instructions: Yes              Again, thank you for allowing me to participate in the care of your patient.        Sincerely,        Arun Stanley DPM    Electronically signed

## 2025-03-17 NOTE — PROGRESS NOTES
Carolyne returns to the office with    assistance for reevaluation of the right foot.  The patient relates following the instructions given at the last visit with noted overall continued  pain and minimal  improvement in function of the right foot.   The patient relates no other problems.      Lower Extremity Physical Exam:      Neurovascular status remains unchanged.  Muscular exam is within normal limits to major muscle groups.  Integument is intact.      Noted pain on palpation over the base of the fifth metatarsal on the right foot.  Noted moderate edema.  No pain on palpation over the gastroc muscle belly on the right    Diagnostics:  Radiograph evaluation including three views of the right foot reveals interval healing with increased trabeculation of the fifth metatarsal base fracture.  I personally evaluated the images as well as reviewed the images with the patient pointing out the findings.      Assessment:     ICD-10-CM    1. Closed nondisplaced fracture of fifth metatarsal bone of right foot, initial encounter  S92.354A           Plan:    I have explained to Carolyne about the progress of the conditions.  At this time, the patient was placed into a short leg cast to further offload the fractured fifth metatarsal.  The fiberglass cast was applied with the right ankle at ninety degrees.  The cast was not tight.  He was instructed to remain non weight bearing with crutches.  The patient was informed that he cannot drive with the cast being that it is on his driving foot.  The patient will return in one month for reevaluation and repeat x-rays.    Carolyne verbalized agreement with and understanding of the rational for the diagnosis and treatment plan.  All questions were answered to best of my ability and the patient's satisfaction. The patient was advised to contact the clinic with any questions that may arise after the clinic visit.      Disclaimer: This note consists of symbols derived from keyboarding,  dictation and/or voice recognition software. As a result, there may be errors in the script that have gone undetected. Please consider this when interpreting information found in this chart.       OBED Stanley D.P.M., FLUPIS.C.F.A.S.    Cast/splint application    Date/Time: 3/17/2025 3:15 PM    Performed by: Ava Murphy ATC  Authorized by: Arun Stanley DPM    Consent:     Consent obtained:  Verbal    Consent given by:  Patient    Risks discussed:  Discoloration, numbness, pain and swelling    Alternatives discussed:  Alternative treatment, observation and referral  Pre-procedure details:     Sensation:  Normal  Procedure details:     Laterality:  Right    Location:  Foot    Foot:  R foot    Cast type:  Short leg    Supplies:  Fiberglass  Post-procedure details:     Pain:  Unchanged    Sensation:  Normal    Patient tolerance of procedure:  Tolerated well, no immediate complications    Patient provided with cast or splint care instructions: Yes

## 2025-03-17 NOTE — LETTER
March 17, 2025      Carolyne Rowley    [address] [place] [address]        To Whom It May Concern:    Carolyne Rowley  was seen on 3/17/25.  Please excuse him from work until 4/17/25 due to injury.        Sincerely,        Arun Stanley DPM    Electronically signed

## 2025-04-18 ENCOUNTER — OFFICE VISIT (OUTPATIENT)
Dept: PODIATRY | Facility: CLINIC | Age: 35
End: 2025-04-18
Payer: OTHER MISCELLANEOUS

## 2025-04-18 ENCOUNTER — ANCILLARY PROCEDURE (OUTPATIENT)
Dept: GENERAL RADIOLOGY | Facility: CLINIC | Age: 35
End: 2025-04-18
Attending: PODIATRIST

## 2025-04-18 DIAGNOSIS — S92.354A CLOSED NONDISPLACED FRACTURE OF FIFTH METATARSAL BONE OF RIGHT FOOT, INITIAL ENCOUNTER: Primary | ICD-10-CM

## 2025-04-18 PROCEDURE — 99213 OFFICE O/P EST LOW 20 MIN: CPT | Performed by: PODIATRIST

## 2025-04-18 PROCEDURE — 73630 X-RAY EXAM OF FOOT: CPT | Mod: TC | Performed by: RADIOLOGY

## 2025-04-18 NOTE — PATIENT INSTRUCTIONS

## 2025-04-18 NOTE — LETTER
4/18/2025      Carolyne Rowley  [address]  [address] [place] [address]      Dear Colleague,    Thank you for referring your patient, Carolyne Rowley, to the [facility]. Please see a copy of my visit note below.    Carolyne returns to the office for reevaluation of the right foot, 5th metatarsal fracture that is now 4 months post-injury.  The patient relates following the instructions given at the last visit with noted overall less pain of the right foot, although continues to have intermittent discomfort overall lateral midfoot.  Cast was removed prior to patient seeing patient.   The patient relates no other problems.      Lower Extremity Physical Exam:      Neurovascular status remains unchanged.  Muscular exam is within normal limits to major muscle groups.  Integument is intact.      Noted decreased pain on palpation over the base of the fifth metatarsal on the right.  Noted decreased edema.    Diagnostics:  Radiograph evaluation including three views of the right foot reveals interval healing with increased trabeculation of the fifth metatarsal base fracture.  I personally evaluated the images as well as reviewed the images with the patient pointing out the findings.      Assessment:     ICD-10-CM    1. Closed nondisplaced fracture of fifth metatarsal bone of right foot, initial encounter  S92.354A           Plan:    I have explained to Carolyne about the progress of the conditions.  At this time, the patient was educated on the importance of offloading supportive shoes and other devices.  I demonstrated to the patient calf stretches to perform every hour daily until symptoms resolve.  After symptoms resolve, the patient was advised to perform the stretches 3 times daily to prevent future recurrence.  The patient was instructed to perform warm soaks with Epson salt after which to also apply over-the-counter Voltaren gel to deeply massage the injured tissue.  The patient was  instructed to do this on a daily basis until symptoms resolve.  The patient was fitted with a Trilok ankle brace that will aid in offloading the tension forces to the soft tissues and prevent further inflammation.   The patient may return in four weeks for reevaluation to determine if any further treatment will be needed.      Carolyne verbalized agreement with and understanding of the rational for the diagnosis and treatment plan.  All questions were answered to best of my ability and the patient's satisfaction. The patient was advised to contact the clinic with any questions that may arise after the clinic visit.      Disclaimer: This note consists of symbols derived from keyboarding, dictation and/or voice recognition software. As a result, there may be errors in the script that have gone undetected. Please consider this when interpreting information found in this chart.       ALIE Prescott.P.KT., F.A.C.F.A.S.      Again, thank you for allowing me to participate in the care of your patient.        Sincerely,        Arun Stanley DPM    Electronically signed

## 2025-04-18 NOTE — PROGRESS NOTES
Carolyne returns to the office for reevaluation of the right foot, 5th metatarsal fracture that is now 4 months post-injury.  The patient relates following the instructions given at the last visit with noted overall less pain of the right foot, although continues to have intermittent discomfort overall lateral midfoot.  Cast was removed prior to patient seeing patient.   The patient relates no other problems.      Lower Extremity Physical Exam:      Neurovascular status remains unchanged.  Muscular exam is within normal limits to major muscle groups.  Integument is intact.      Noted decreased pain on palpation over the base of the fifth metatarsal on the right.  Noted decreased edema.    Diagnostics:  Radiograph evaluation including three views of the right foot reveals interval healing with increased trabeculation of the fifth metatarsal base fracture.  I personally evaluated the images as well as reviewed the images with the patient pointing out the findings.      Assessment:     ICD-10-CM    1. Closed nondisplaced fracture of fifth metatarsal bone of right foot, initial encounter  S92.354A           Plan:    I have explained to Carolyne about the progress of the conditions.  At this time, the patient was educated on the importance of offloading supportive shoes and other devices.  I demonstrated to the patient calf stretches to perform every hour daily until symptoms resolve.  After symptoms resolve, the patient was advised to perform the stretches 3 times daily to prevent future recurrence.  The patient was instructed to perform warm soaks with Epson salt after which to also apply over-the-counter Voltaren gel to deeply massage the injured tissue.  The patient was instructed to do this on a daily basis until symptoms resolve.  The patient was fitted with a Trilok ankle brace that will aid in offloading the tension forces to the soft tissues and prevent further inflammation.   The patient may return in four weeks for  reevaluation to determine if any further treatment will be needed.      Carolyne verbalized agreement with and understanding of the rational for the diagnosis and treatment plan.  All questions were answered to best of my ability and the patient's satisfaction. The patient was advised to contact the clinic with any questions that may arise after the clinic visit.      Disclaimer: This note consists of symbols derived from keyboarding, dictation and/or voice recognition software. As a result, there may be errors in the script that have gone undetected. Please consider this when interpreting information found in this chart.       OBED Stanley D.P.M., F.JOHANNA.C.F.A.S.

## 2025-04-18 NOTE — LETTER
April 18, 2025      Carolyne Rowley    [address] [place] [address]        To Whom It May Concern:    Carolyne Rowley was seen in our clinic. He may return to work without restrictions as of 4/18/25.      Sincerely,        Arun Stanley DPM    Electronically signed